# Patient Record
Sex: MALE | Race: OTHER | NOT HISPANIC OR LATINO
[De-identification: names, ages, dates, MRNs, and addresses within clinical notes are randomized per-mention and may not be internally consistent; named-entity substitution may affect disease eponyms.]

---

## 2017-02-23 PROBLEM — Z00.00 ENCOUNTER FOR PREVENTIVE HEALTH EXAMINATION: Status: ACTIVE | Noted: 2017-02-23

## 2018-01-25 ENCOUNTER — TRANSCRIPTION ENCOUNTER (OUTPATIENT)
Age: 23
End: 2018-01-25

## 2022-07-25 ENCOUNTER — APPOINTMENT (OUTPATIENT)
Dept: NEUROSURGERY | Facility: CLINIC | Age: 27
End: 2022-07-25

## 2022-07-25 VITALS — BODY MASS INDEX: 25.05 KG/M2 | HEIGHT: 70 IN | WEIGHT: 175 LBS

## 2022-07-25 DIAGNOSIS — Z98.890 OTHER SPECIFIED POSTPROCEDURAL STATES: ICD-10-CM

## 2022-07-25 DIAGNOSIS — Z78.9 OTHER SPECIFIED HEALTH STATUS: ICD-10-CM

## 2022-07-25 DIAGNOSIS — M54.40 LUMBAGO WITH SCIATICA, UNSPECIFIED SIDE: ICD-10-CM

## 2022-07-25 DIAGNOSIS — R20.2 PARESTHESIA OF SKIN: ICD-10-CM

## 2022-07-25 DIAGNOSIS — G89.29 LUMBAGO WITH SCIATICA, UNSPECIFIED SIDE: ICD-10-CM

## 2022-07-25 DIAGNOSIS — F17.290 NICOTINE DEPENDENCE, OTHER TOBACCO PRODUCT, UNCOMPLICATED: ICD-10-CM

## 2022-07-25 PROCEDURE — 99204 OFFICE O/P NEW MOD 45 MIN: CPT

## 2022-07-25 RX ORDER — IBUPROFEN 800 MG/1
TABLET, FILM COATED ORAL
Refills: 0 | Status: ACTIVE | COMMUNITY

## 2022-07-25 RX ORDER — MULTIVITAMIN
TABLET ORAL
Refills: 0 | Status: ACTIVE | COMMUNITY

## 2022-07-27 PROBLEM — M54.40 CHRONIC BILATERAL LOW BACK PAIN WITH SCIATICA, SCIATICA LATERALITY UNSPECIFIED: Status: ACTIVE | Noted: 2022-07-27

## 2022-07-27 NOTE — HISTORY OF PRESENT ILLNESS
[de-identified] : DOI: 1/23/2020\par \par Mr Gary is a pleasant 27 yo young man who comes to the office today as a new patient. 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 Legs felt weak. He underwent ? discectomy with Dr. Ramos. Following surgery states the numbness improved for a few months, but he continued to have dull throbbing pain and numbness in his legs, worse with prolonged sitting. Prolonged standing causes "hot pain" in the low back, bending causes sharp bouts of pain. \par \omid Has competed PT for approximately 3 months after surgery without sustained relief. Underwent injection June 15th, without any relief. \par \par Denies any Acupuncture or Chiropractic treatment. \par \par Denies any bowel or bladder incontinence. \par \par Has not undergone an EMG\par \par IMAGING: R  4/25/2022 MRI wwo contrast demonstrates disc desiccation at L4-5 no significant central or foraminal stenosis. Normal alignment maintained.\par \par PE:\par Constitutional: Well appearing, no distress sitting on the chair in mild discomfort\par HEENT: Normocephalic Atraumatic, sclerae anicteric, mucus membranes moist, trachea midline\par Psychiatric: Alert and oriented to all spheres, normal mood\par Pulmonary: No respiratory distress or accessory muscle use\par \par Neurologic:\par CN II-XII grossly intact \par Palpation: Tenderness to palpation over lumbosacral paraspinal musculature as well as midline. \par Strength: Full strength in all major muscle groups, no atrophy\par Sensation: Full sensation to light touch in all extremities\par \par Reflexes:\par 2+ patellar\par 2+ biceps\par 2+ ankle jerk\par No Leblanc's\par No clonus\par No babinski\par \par ROM intact through all planes with passive movement\par \par Signs:\par SLR negative; Eyad's maneuver negative\par \par Gait: Mildly antalgic\par

## 2022-07-27 NOTE — REVIEW OF SYSTEMS
[As Noted in HPI] : as noted in HPI [Negative] : Psychiatric [Fever] : no fever [Chills] : no chills [Eye Pain] : no eye pain [Red Eyes] : eyes not red [Earache] : no earache [Loss Of Hearing] : no hearing loss [Chest Pain] : no chest pain [Palpitations] : no palpitations [Vomiting] : no vomiting [Incontinence] : no incontinence

## 2025-07-25 ENCOUNTER — NON-APPOINTMENT (OUTPATIENT)
Age: 30
End: 2025-07-25